# Patient Record
Sex: MALE | Race: WHITE | NOT HISPANIC OR LATINO
[De-identification: names, ages, dates, MRNs, and addresses within clinical notes are randomized per-mention and may not be internally consistent; named-entity substitution may affect disease eponyms.]

---

## 2017-05-01 ENCOUNTER — NON-APPOINTMENT (OUTPATIENT)
Age: 77
End: 2017-05-01

## 2017-05-01 ENCOUNTER — APPOINTMENT (OUTPATIENT)
Dept: CARDIOLOGY | Facility: CLINIC | Age: 77
End: 2017-05-01

## 2017-05-01 VITALS
SYSTOLIC BLOOD PRESSURE: 114 MMHG | HEIGHT: 64.5 IN | HEART RATE: 67 BPM | OXYGEN SATURATION: 98 % | WEIGHT: 139.5 LBS | BODY MASS INDEX: 23.53 KG/M2 | DIASTOLIC BLOOD PRESSURE: 70 MMHG

## 2017-05-16 ENCOUNTER — APPOINTMENT (OUTPATIENT)
Dept: CARDIOLOGY | Facility: CLINIC | Age: 77
End: 2017-05-16

## 2019-04-22 ENCOUNTER — NON-APPOINTMENT (OUTPATIENT)
Age: 79
End: 2019-04-22

## 2019-04-22 ENCOUNTER — APPOINTMENT (OUTPATIENT)
Dept: CARDIOLOGY | Facility: CLINIC | Age: 79
End: 2019-04-22
Payer: MEDICARE

## 2019-04-22 VITALS
BODY MASS INDEX: 23.22 KG/M2 | WEIGHT: 136 LBS | SYSTOLIC BLOOD PRESSURE: 103 MMHG | HEART RATE: 68 BPM | HEIGHT: 64 IN | DIASTOLIC BLOOD PRESSURE: 64 MMHG | OXYGEN SATURATION: 96 %

## 2019-04-22 DIAGNOSIS — R09.89 OTHER SPECIFIED SYMPTOMS AND SIGNS INVOLVING THE CIRCULATORY AND RESPIRATORY SYSTEMS: ICD-10-CM

## 2019-04-22 PROCEDURE — 93000 ELECTROCARDIOGRAM COMPLETE: CPT

## 2019-04-22 PROCEDURE — 99215 OFFICE O/P EST HI 40 MIN: CPT

## 2019-04-22 NOTE — DISCUSSION/SUMMARY
[FreeTextEntry1] : The patient has no signs or symptoms of active heart disease. He is physically active and has no chest pain or shortness of breath. On his medications cholesterol is well controlled and his blood pressure is excellent. He is concerned about his coronary arteries.\par \par We discussed about doing a cardiac calcium score which may be worthwhile. This will qualitate the amount of coronary plaque and allow us to better decide if we want to be more aggressive with his cholesterol medication. He'll also schedule a repeat carotid Doppler.\par \par On the basis of these tests we'll decide if we want to change his statin medication. Otherwise he'll continue with his good healthy diet and exercise. If he does have symptoms he will call me.\par \par This was all discussed in detail with the patient his wife and I answered all of their questions.

## 2019-04-22 NOTE — PHYSICAL EXAM
[General Appearance - Well Developed] : well developed [Normal Appearance] : normal appearance [Well Groomed] : well groomed [General Appearance - Well Nourished] : well nourished [No Deformities] : no deformities [General Appearance - In No Acute Distress] : no acute distress [Normal Conjunctiva] : the conjunctiva exhibited no abnormalities [Eyelids - No Xanthelasma] : the eyelids demonstrated no xanthelasmas [Normal Oral Mucosa] : normal oral mucosa [Normal Jugular Venous A Waves Present] : normal jugular venous A waves present [No Jugular Venous Madrigal A Waves] : no jugular venous madrigal A waves [Normal Jugular Venous V Waves Present] : normal jugular venous V waves present [Respiration, Rhythm And Depth] : normal respiratory rhythm and effort [Exaggerated Use Of Accessory Muscles For Inspiration] : no accessory muscle use [Auscultation Breath Sounds / Voice Sounds] : lungs were clear to auscultation bilaterally [Bowel Sounds] : normal bowel sounds [Abdomen Soft] : soft [Abdomen Tenderness] : non-tender [Abnormal Walk] : normal gait [Gait - Sufficient For Exercise Testing] : the gait was sufficient for exercise testing [Nail Clubbing] : no clubbing of the fingernails [Skin Color & Pigmentation] : normal skin color and pigmentation [Cyanosis, Localized] : no localized cyanosis [Skin Turgor] : normal skin turgor [] : no rash [Oriented To Time, Place, And Person] : oriented to person, place, and time [Affect] : the affect was normal [Mood] : the mood was normal [No Anxiety] : not feeling anxious [Normal Rate] : normal [Normal S1] : normal S1 [Normal S2] : normal S2 [No Murmur] : no murmurs heard [2+] : left 2+ [1+] : left 1+ [No Abnormalities] : the abdominal aorta was not enlarged and no bruit was heard [No Pitting Edema] : no pitting edema present [S4] : no S4 [S3] : no S3 [Right Carotid Bruit] : no bruit heard over the right carotid [Right Femoral Bruit] : no bruit heard over the right femoral artery [Left Carotid Bruit] : no bruit heard over the left carotid [Left Femoral Bruit] : no bruit heard over the left femoral artery

## 2019-04-22 NOTE — HISTORY OF PRESENT ILLNESS
[FreeTextEntry1] : I saw Carlos Madden to our office for cardiac followup. He is a 78-year-old white male who has enjoyed good general health. He is presently being treated for hyperlipidemia. He has a remote history of smoking stopping at least 30 years ago. Has no history of diabetes, hypertension, or family history of heart disease.\par \par The patient has been undergoing CAT scans of the chest to check on pulmonary nodules which have been stable.. On a CAT scan 4/12  coronary artery calcification was noted. The patient had a stress test in my office 5/17 which was negative for ischemia at a workload of 10METs\par \par The patient exercises regularly. He walks 3 miles every other day and lifts weights. No exertional chest pain, shortness of breath, lightheadedness, or palpitations.\par \par He has no prior hospitalizations. He did fracture her shoulder, otherwise has had no surgery.\par \par He drinks wine socially and has one cup of a mix of decaf in caffeinated coffee a day.\par \par Blood work dated 4/19 demonstrates a total cholesterol 137, HDL 56, and LDL 65. Carotid Doppler performed 5/16 demonstrated mild to moderate plaque.Patient's brother recently had a cardiac stent placed for extensive plaque. The patient a repeat CAT scan of his chest performed 12/18. It's listed as coronary artery calcification. No quantification.\par \par A resting 12-lead electrocardiogram demonstrates sinus rhythm and appears to be normal.

## 2019-04-22 NOTE — REVIEW OF SYSTEMS
[Eyeglasses] : currently wearing eyeglasses [Urinary Frequency] : urinary frequency [Nocturia] : nocturia [Negative] : Psychiatric [Fever] : no fever [Headache] : no headache [Chills] : no chills [Feeling Fatigued] : not feeling fatigued [Blurry Vision] : no blurred vision [Hematuria] : no hematuria [Pain During Urination] : no dysuria [Joint Pain] : no joint pain [Muscle Cramps] : no muscle cramps [Skin: A Rash] : no rash: [Skin Lesions] : no skin lesions [Tremor] : no tremor was seen [Dizziness] : no dizziness [Convulsions] : no convulsions [Easy Bleeding] : no tendency for easy bleeding [Easy Bruising] : no tendency for easy bruising

## 2019-04-22 NOTE — REASON FOR VISIT
[Follow-Up - Clinic] : a clinic follow-up of [Hyperlipidemia] : hyperlipidemia [FreeTextEntry1] : Calcification of coronary arteries on CT scan

## 2019-04-26 ENCOUNTER — APPOINTMENT (OUTPATIENT)
Dept: CARDIOLOGY | Facility: CLINIC | Age: 79
End: 2019-04-26
Payer: MEDICARE

## 2019-04-26 PROCEDURE — 93880 EXTRACRANIAL BILAT STUDY: CPT

## 2019-05-03 ENCOUNTER — APPOINTMENT (OUTPATIENT)
Dept: CARDIOLOGY | Facility: CLINIC | Age: 79
End: 2019-05-03
Payer: MEDICARE

## 2019-05-03 PROCEDURE — A9500: CPT

## 2019-05-03 PROCEDURE — 78452 HT MUSCLE IMAGE SPECT MULT: CPT

## 2019-05-03 PROCEDURE — 93015 CV STRESS TEST SUPVJ I&R: CPT

## 2019-05-06 RX ORDER — ROSUVASTATIN CALCIUM 20 MG/1
20 TABLET, FILM COATED ORAL DAILY
Qty: 30 | Refills: 5 | Status: ACTIVE | COMMUNITY
Start: 1900-01-01 | End: 1900-01-01

## 2019-10-17 ENCOUNTER — APPOINTMENT (OUTPATIENT)
Dept: CARDIOLOGY | Facility: CLINIC | Age: 79
End: 2019-10-17
Payer: MEDICARE

## 2019-10-17 VITALS
OXYGEN SATURATION: 96 % | HEIGHT: 64 IN | DIASTOLIC BLOOD PRESSURE: 64 MMHG | BODY MASS INDEX: 22.53 KG/M2 | HEART RATE: 68 BPM | SYSTOLIC BLOOD PRESSURE: 133 MMHG | WEIGHT: 132 LBS

## 2019-10-17 PROCEDURE — 99215 OFFICE O/P EST HI 40 MIN: CPT

## 2019-10-17 NOTE — DISCUSSION/SUMMARY
[FreeTextEntry1] : Discussed in detail the results of the calcium score, stress test, and carotid Doppler. Went over his medications and his blood work. The patient is doing well. Since he developed his plaque on Zocor 40 mg once a day, I suggest that we increase the Crestor at 40 mg once a day as a maximum protective dose. Prior to raising the dose, I would start the patient on CoQ10 since he is getting some muscle cramps at night in bed. This predated the Crestor and probably is not related.\par \par The patient wants to think about this. He'll call me in several weeks so that we can decide what do. Otherwise I will see him in 3 months. If he does develop exertional symptoms he will call me.\par \par This was all discussed with the patient and his wife and I answered all their questions.

## 2019-10-17 NOTE — PHYSICAL EXAM
[Well Groomed] : well groomed [Normal Appearance] : normal appearance [General Appearance - Well Developed] : well developed [General Appearance - Well Nourished] : well nourished [No Deformities] : no deformities [General Appearance - In No Acute Distress] : no acute distress [Normal Conjunctiva] : the conjunctiva exhibited no abnormalities [Normal Oral Mucosa] : normal oral mucosa [Eyelids - No Xanthelasma] : the eyelids demonstrated no xanthelasmas [Normal Jugular Venous V Waves Present] : normal jugular venous V waves present [No Jugular Venous Madrigal A Waves] : no jugular venous madrigal A waves [Normal Jugular Venous A Waves Present] : normal jugular venous A waves present [Respiration, Rhythm And Depth] : normal respiratory rhythm and effort [Exaggerated Use Of Accessory Muscles For Inspiration] : no accessory muscle use [Auscultation Breath Sounds / Voice Sounds] : lungs were clear to auscultation bilaterally [Abdomen Tenderness] : non-tender [Bowel Sounds] : normal bowel sounds [Abdomen Soft] : soft [Gait - Sufficient For Exercise Testing] : the gait was sufficient for exercise testing [Abnormal Walk] : normal gait [Nail Clubbing] : no clubbing of the fingernails [Skin Turgor] : normal skin turgor [Cyanosis, Localized] : no localized cyanosis [Skin Color & Pigmentation] : normal skin color and pigmentation [] : no rash [Oriented To Time, Place, And Person] : oriented to person, place, and time [Affect] : the affect was normal [Mood] : the mood was normal [Normal Rate] : normal [No Anxiety] : not feeling anxious [Normal S2] : normal S2 [Normal S1] : normal S1 [No Murmur] : no murmurs heard [2+] : left 2+ [1+] : right 1+ [No Abnormalities] : the abdominal aorta was not enlarged and no bruit was heard [No Pitting Edema] : no pitting edema present [S3] : no S3 [S4] : no S4 [Right Carotid Bruit] : no bruit heard over the right carotid [Left Carotid Bruit] : no bruit heard over the left carotid [Right Femoral Bruit] : no bruit heard over the right femoral artery [Left Femoral Bruit] : no bruit heard over the left femoral artery

## 2019-10-17 NOTE — REVIEW OF SYSTEMS
[Eyeglasses] : currently wearing eyeglasses [Urinary Frequency] : urinary frequency [Nocturia] : nocturia [Negative] : Psychiatric [Fever] : no fever [Headache] : no headache [Chills] : no chills [Feeling Fatigued] : not feeling fatigued [Blurry Vision] : no blurred vision [Hematuria] : no hematuria [Pain During Urination] : no dysuria [Muscle Cramps] : no muscle cramps [Joint Pain] : no joint pain [Skin Lesions] : no skin lesions [Skin: A Rash] : no rash: [Dizziness] : no dizziness [Tremor] : no tremor was seen [Convulsions] : no convulsions [Easy Bleeding] : no tendency for easy bleeding [Easy Bruising] : no tendency for easy bruising

## 2019-10-17 NOTE — HISTORY OF PRESENT ILLNESS
[FreeTextEntry1] : I saw Carlos Madden to our office for cardiac followup. He is a 79-year-old white male who has enjoyed good general health. He is presently being treated for hyperlipidemia. He has a remote history of smoking stopping at least 30 years ago. Has no history of diabetes, hypertension, or family history of heart disease.\par \par The patient has been undergoing CAT scans of the chest to check on pulmonary nodules which have been stable.. On a CAT scan 4/12  coronary artery calcification was noted. The patient had a stress test in my office 5/17 which was negative for ischemia at a workload of 10METs\par \par The patient exercises regularly. He walks 3 miles every other day and lifts weights. No exertional chest pain, shortness of breath, lightheadedness, or palpitations.\par \par He has no prior hospitalizations. He did fracture her shoulder, otherwise has had no surgery.\par \par He drinks wine socially and has one cup of a mix of decaf in caffeinated coffee a day.\par \par Blood work dated 4/19 demonstrates a total cholesterol 137, HDL 56, and LDL 65. Carotid Doppler performed 5/16 demonstrated mild to moderate plaque.Patient's brother recently had a cardiac stent placed for extensive plaque. \par \par The patient had a cardiac calcium score performed 5/19. The total score was 523.   11 left main, 330 LAD, 93 Circumflex, and 88 RCA. The patient a nuclear stress test that showed no ischemia at workload of 10 minutes. EF 67%. Carotid Doppler showed mild nonobstructive plaque. The patient is now taking aspirin 81 mg once a day and we increased his statin drug to Crestor 20 mg once a day. Recent blood work demonstrates a cholesterol 112, triglycerides 75, HDL 52, and LDL 48.

## 2019-10-17 NOTE — REASON FOR VISIT
[Hyperlipidemia] : hyperlipidemia [Follow-Up - Clinic] : a clinic follow-up of [FreeTextEntry1] : Calcification of coronary arteries on CT scan

## 2020-01-22 ENCOUNTER — APPOINTMENT (OUTPATIENT)
Dept: CARDIOLOGY | Facility: CLINIC | Age: 80
End: 2020-01-22
Payer: MEDICARE

## 2020-01-22 ENCOUNTER — NON-APPOINTMENT (OUTPATIENT)
Age: 80
End: 2020-01-22

## 2020-01-22 VITALS
BODY MASS INDEX: 23.9 KG/M2 | DIASTOLIC BLOOD PRESSURE: 74 MMHG | HEART RATE: 71 BPM | HEIGHT: 64 IN | WEIGHT: 140 LBS | OXYGEN SATURATION: 97 % | SYSTOLIC BLOOD PRESSURE: 129 MMHG

## 2020-01-22 PROCEDURE — 99214 OFFICE O/P EST MOD 30 MIN: CPT

## 2020-01-22 PROCEDURE — 93000 ELECTROCARDIOGRAM COMPLETE: CPT

## 2020-01-22 NOTE — REVIEW OF SYSTEMS
[Eyeglasses] : currently wearing eyeglasses [Urinary Frequency] : urinary frequency [Nocturia] : nocturia [Negative] : Psychiatric [Fever] : no fever [Headache] : no headache [Chills] : no chills [Feeling Fatigued] : not feeling fatigued [Blurry Vision] : no blurred vision [Hematuria] : no hematuria [Pain During Urination] : no dysuria [Joint Pain] : no joint pain [Muscle Cramps] : no muscle cramps [Skin: A Rash] : no rash: [Skin Lesions] : no skin lesions [Dizziness] : no dizziness [Tremor] : no tremor was seen [Convulsions] : no convulsions [Easy Bleeding] : no tendency for easy bleeding [Easy Bruising] : no tendency for easy bruising

## 2020-01-22 NOTE — DISCUSSION/SUMMARY
[FreeTextEntry1] : The patient continues to do well without any signs or symptoms of active heart disease. He exercises regularly and has no chest pain or shortness of breath. Has a high cardiac calcium score with a normal stress test. Cholesterol is well controlled and his blood pressure is normal.\par \par He will stay on his present medication. If he does develop exertional symptoms he will call me. We did discuss his most recent blood tests, carotid Doppler, and stress test. If all is well I will see him in 6 months.

## 2020-01-22 NOTE — HISTORY OF PRESENT ILLNESS
[FreeTextEntry1] : I saw Carlos Madden to our office for cardiac followup. He is a 79-year-old white male who has enjoyed good general health. He is presently being treated for hyperlipidemia. He has a remote history of smoking stopping at least 30 years ago. Has no history of diabetes, hypertension, or family history of heart disease.\par \par The patient has been undergoing CAT scans of the chest to check on pulmonary nodules which have been stable.. On a CAT scan 4/12  coronary artery calcification was noted. The patient had a stress test in my office 5/17 which was negative for ischemia at a workload of 10METs\par \par The patient exercises regularly. He walks 3 miles every other day and lifts weights. No exertional chest pain, shortness of breath, lightheadedness, or palpitations.\par \par He has no prior hospitalizations. He did fracture her shoulder, otherwise has had no surgery.\par \par He drinks wine socially and has one cup of a mix of decaf in caffeinated coffee a day.\par \par .Carotid Doppler performed 4/19 demonstrated mild plaque.Patient's brother recently had a cardiac stent placed for extensive plaque. \par \par The patient had a cardiac calcium score performed 5/19. The total score was 523.   11 left main, 330 LAD, 93 Circumflex, and 88 RCA. The patient a nuclear stress test that showed no ischemia at workload of 10 minutes. EF 67%. Carotid Doppler showed mild nonobstructive plaque. The patient is now taking aspirin 81 mg once a day and we increased his statin drug to Crestor 20 mg once a day. Blood work  10/19, demonstrates a cholesterol 112, triglycerides 75, HDL 52, and LDL 45.\par \par ECG shows sinus rhythm and remains normal.

## 2020-01-22 NOTE — PHYSICAL EXAM
[General Appearance - Well Developed] : well developed [Normal Appearance] : normal appearance [Well Groomed] : well groomed [General Appearance - Well Nourished] : well nourished [No Deformities] : no deformities [General Appearance - In No Acute Distress] : no acute distress [Normal Conjunctiva] : the conjunctiva exhibited no abnormalities [Normal Oral Mucosa] : normal oral mucosa [Eyelids - No Xanthelasma] : the eyelids demonstrated no xanthelasmas [Normal Jugular Venous A Waves Present] : normal jugular venous A waves present [Normal Jugular Venous V Waves Present] : normal jugular venous V waves present [No Jugular Venous Madrigal A Waves] : no jugular venous madrigal A waves [Respiration, Rhythm And Depth] : normal respiratory rhythm and effort [Exaggerated Use Of Accessory Muscles For Inspiration] : no accessory muscle use [Auscultation Breath Sounds / Voice Sounds] : lungs were clear to auscultation bilaterally [Bowel Sounds] : normal bowel sounds [Abdomen Soft] : soft [Abdomen Tenderness] : non-tender [Abnormal Walk] : normal gait [Gait - Sufficient For Exercise Testing] : the gait was sufficient for exercise testing [Nail Clubbing] : no clubbing of the fingernails [Cyanosis, Localized] : no localized cyanosis [Skin Color & Pigmentation] : normal skin color and pigmentation [Oriented To Time, Place, And Person] : oriented to person, place, and time [] : no rash [Skin Turgor] : normal skin turgor [Affect] : the affect was normal [Mood] : the mood was normal [No Anxiety] : not feeling anxious [Normal Rate] : normal [Normal S1] : normal S1 [Normal S2] : normal S2 [No Murmur] : no murmurs heard [2+] : left 2+ [1+] : left 1+ [No Abnormalities] : the abdominal aorta was not enlarged and no bruit was heard [No Pitting Edema] : no pitting edema present [S4] : no S4 [Right Carotid Bruit] : no bruit heard over the right carotid [S3] : no S3 [Left Carotid Bruit] : no bruit heard over the left carotid [Right Femoral Bruit] : no bruit heard over the right femoral artery [Left Femoral Bruit] : no bruit heard over the left femoral artery

## 2020-08-10 ENCOUNTER — APPOINTMENT (OUTPATIENT)
Dept: CARDIOLOGY | Facility: CLINIC | Age: 80
End: 2020-08-10
Payer: MEDICARE

## 2020-08-10 VITALS
HEIGHT: 64 IN | DIASTOLIC BLOOD PRESSURE: 64 MMHG | BODY MASS INDEX: 23.56 KG/M2 | WEIGHT: 138 LBS | HEART RATE: 70 BPM | OXYGEN SATURATION: 96 % | SYSTOLIC BLOOD PRESSURE: 107 MMHG

## 2020-08-10 PROCEDURE — 99214 OFFICE O/P EST MOD 30 MIN: CPT

## 2020-08-10 NOTE — DISCUSSION/SUMMARY
[FreeTextEntry1] : The patient continues to do well. He has no signs or symptoms of active heart disease. He has no chest pain or shortness of breath. Blood pressure and cholesterol are excellent.\par \par He'll stay on his present medication. We did go over his stress test from last year as well as his carotid Doppler. He will stay on his present medications. If he does have any problems or symptoms he will call me. Otherwise I will see him in 6 months.\par \par We discussed his medication and I answered his questions.

## 2020-08-10 NOTE — PHYSICAL EXAM
[General Appearance - Well Developed] : well developed [Well Groomed] : well groomed [General Appearance - Well Nourished] : well nourished [Normal Appearance] : normal appearance [Normal Conjunctiva] : the conjunctiva exhibited no abnormalities [General Appearance - In No Acute Distress] : no acute distress [No Deformities] : no deformities [Eyelids - No Xanthelasma] : the eyelids demonstrated no xanthelasmas [Normal Oral Mucosa] : normal oral mucosa [No Jugular Venous Madrigal A Waves] : no jugular venous madrigal A waves [Normal Jugular Venous A Waves Present] : normal jugular venous A waves present [Normal Jugular Venous V Waves Present] : normal jugular venous V waves present [Respiration, Rhythm And Depth] : normal respiratory rhythm and effort [Exaggerated Use Of Accessory Muscles For Inspiration] : no accessory muscle use [Auscultation Breath Sounds / Voice Sounds] : lungs were clear to auscultation bilaterally [Bowel Sounds] : normal bowel sounds [Abdomen Soft] : soft [Abdomen Tenderness] : non-tender [Abnormal Walk] : normal gait [Gait - Sufficient For Exercise Testing] : the gait was sufficient for exercise testing [Skin Color & Pigmentation] : normal skin color and pigmentation [Cyanosis, Localized] : no localized cyanosis [Nail Clubbing] : no clubbing of the fingernails [Skin Turgor] : normal skin turgor [] : no rash [Oriented To Time, Place, And Person] : oriented to person, place, and time [Affect] : the affect was normal [No Anxiety] : not feeling anxious [Mood] : the mood was normal [Normal S1] : normal S1 [Normal Rate] : normal [Normal S2] : normal S2 [No Murmur] : no murmurs heard [2+] : left 2+ [1+] : right 1+ [No Abnormalities] : the abdominal aorta was not enlarged and no bruit was heard [No Pitting Edema] : no pitting edema present [S3] : no S3 [Right Carotid Bruit] : no bruit heard over the right carotid [S4] : no S4 [Left Carotid Bruit] : no bruit heard over the left carotid [Right Femoral Bruit] : no bruit heard over the right femoral artery [Left Femoral Bruit] : no bruit heard over the left femoral artery

## 2020-08-10 NOTE — REVIEW OF SYSTEMS
[Eyeglasses] : currently wearing eyeglasses [Urinary Frequency] : urinary frequency [Nocturia] : nocturia [Negative] : Psychiatric [Fever] : no fever [Headache] : no headache [Chills] : no chills [Feeling Fatigued] : not feeling fatigued [Blurry Vision] : no blurred vision [Hematuria] : no hematuria [Pain During Urination] : no dysuria [Muscle Cramps] : no muscle cramps [Joint Pain] : no joint pain [Skin: A Rash] : no rash: [Skin Lesions] : no skin lesions [Dizziness] : no dizziness [Convulsions] : no convulsions [Tremor] : no tremor was seen [Easy Bruising] : no tendency for easy bruising [Easy Bleeding] : no tendency for easy bleeding

## 2020-08-10 NOTE — HISTORY OF PRESENT ILLNESS
[FreeTextEntry1] : I saw Carlos Madden to our office for cardiac followup. He is an 80-year-old white male who has enjoyed good general health. He is presently being treated for hyperlipidemia. He has a remote history of smoking stopping at least 30 years ago. Has no history of diabetes, hypertension, or family history of heart disease.\par \par The patient has been undergoing CAT scans of the chest to check on pulmonary nodules which have been stable.. On a CAT scan 4/12  coronary artery calcification was noted. The patient had a stress test in my office 5/17 which was negative for ischemia at a workload of 10METs\par \par The patient exercises regularly. He walks 3 miles every other day and lifts weights. No exertional chest pain, shortness of breath, lightheadedness, or palpitations.\par \par He has no prior hospitalizations. He did fracture her shoulder, otherwise has had no surgery.\par \par He drinks wine socially and has one cup of a mix of decaf in caffeinated coffee a day.\par \par .Carotid Doppler performed 4/19 demonstrated mild plaque.Patient's brother recently had a cardiac stent placed for extensive plaque. \par \par The patient had a cardiac calcium score performed 5/19. The total score was 523.   11 left main, 330 LAD, 93 Circumflex, and 88 RCA. The patient a nuclear stress test that showed no ischemia at workload of 10 minutes. EF 67%. Carotid Doppler showed mild nonobstructive plaque. The patient is now taking aspirin 81 mg once a day and we increased his statin drug to Crestor 20 mg once a day. Blood work 4/20 demonstrated a total cholesterol 117, triglycerides 64, HDL 52, and LDL 52\par \par The patient is physically active and has no chest pain or shortness of breath.\par \par ECG shows sinus rhythm and remains normal.

## 2021-02-10 ENCOUNTER — APPOINTMENT (OUTPATIENT)
Dept: CARDIOLOGY | Facility: CLINIC | Age: 81
End: 2021-02-10
Payer: MEDICARE

## 2021-02-10 ENCOUNTER — NON-APPOINTMENT (OUTPATIENT)
Age: 81
End: 2021-02-10

## 2021-02-10 VITALS
WEIGHT: 140 LBS | DIASTOLIC BLOOD PRESSURE: 60 MMHG | HEIGHT: 65 IN | HEART RATE: 69 BPM | BODY MASS INDEX: 23.32 KG/M2 | OXYGEN SATURATION: 95 % | SYSTOLIC BLOOD PRESSURE: 107 MMHG

## 2021-02-10 PROCEDURE — 99214 OFFICE O/P EST MOD 30 MIN: CPT

## 2021-02-10 PROCEDURE — 93000 ELECTROCARDIOGRAM COMPLETE: CPT

## 2021-02-10 NOTE — DISCUSSION/SUMMARY
[FreeTextEntry1] : The patient is doing well without any signs or symptoms of active heart disease. He remains physically active and has no chest pain, shortness of breath, or palpitation. Blood pressure is excellent and on medication his cholesterol is well controlled.\par \par He has an appointment to see you and if you draw blood work I would appreciate a copy for my review. We did go over his medication, his previous blood work and his cardiac testing and I answered his questions.\par \par If he does have any exertional symptoms he will call me. If all is well I will see him in 6 months.

## 2021-02-10 NOTE — HISTORY OF PRESENT ILLNESS
[FreeTextEntry1] : I saw Carlos Madden to our office for cardiac followup. He is an 80-year-old white male who has enjoyed good general health. He is presently being treated for hyperlipidemia. He has a remote history of smoking stopping at least 30 years ago. Has no history of diabetes, hypertension, or family history of heart disease.\par \par The patient has been undergoing CAT scans of the chest to check on pulmonary nodules which have been stable.. On a CAT scan 4/12  coronary artery calcification was noted. The patient had a stress test in my office 5/17 which was negative for ischemia at a workload of 10METs\par \par The patient exercises regularly. He walks 3 miles every other day and lifts weights. No exertional chest pain, shortness of breath, lightheadedness, or palpitations.\par \par He has no prior hospitalizations. He did fracture her shoulder, otherwise has had no surgery.\par \par He drinks wine socially and has one cup of a mix of decaf in caffeinated coffee a day.\par \par .Carotid Doppler performed 4/19 demonstrated mild plaque.Patient's brother recently had a cardiac stent placed for extensive plaque. \par \par The patient had a cardiac calcium score performed 5/19. The total score was 523.   11 left main, 330 LAD, 93 Circumflex, and 88 RCA. The patient a nuclear stress test that showed no ischemia at workload of 10 minutes. EF 67%. Carotid Doppler showed mild nonobstructive plaque. The patient is now taking aspirin 81 mg once a day and we increased his statin drug to Crestor 20 mg once a day. Blood work 8/20 demonstrated a total cholesterol 120, triglycerides 83, HDL 52, and LDL 51\par \par The patient is physically active and has no chest pain or shortness of breath.\par \par ECG shows sinus rhythm and remains normal.

## 2021-02-10 NOTE — PHYSICAL EXAM
[General Appearance - Well Developed] : well developed [Normal Appearance] : normal appearance [Well Groomed] : well groomed [General Appearance - Well Nourished] : well nourished [No Deformities] : no deformities [General Appearance - In No Acute Distress] : no acute distress [Normal Conjunctiva] : the conjunctiva exhibited no abnormalities [Normal Oral Mucosa] : normal oral mucosa [Eyelids - No Xanthelasma] : the eyelids demonstrated no xanthelasmas [Normal Jugular Venous A Waves Present] : normal jugular venous A waves present [Normal Jugular Venous V Waves Present] : normal jugular venous V waves present [No Jugular Venous Madrigal A Waves] : no jugular venous madrigal A waves [Respiration, Rhythm And Depth] : normal respiratory rhythm and effort [Auscultation Breath Sounds / Voice Sounds] : lungs were clear to auscultation bilaterally [Exaggerated Use Of Accessory Muscles For Inspiration] : no accessory muscle use [Bowel Sounds] : normal bowel sounds [Abdomen Soft] : soft [Abdomen Tenderness] : non-tender [Abnormal Walk] : normal gait [Nail Clubbing] : no clubbing of the fingernails [Gait - Sufficient For Exercise Testing] : the gait was sufficient for exercise testing [Cyanosis, Localized] : no localized cyanosis [Skin Color & Pigmentation] : normal skin color and pigmentation [Skin Turgor] : normal skin turgor [] : no rash [Oriented To Time, Place, And Person] : oriented to person, place, and time [Mood] : the mood was normal [Affect] : the affect was normal [No Anxiety] : not feeling anxious [Normal Rate] : normal [Normal S2] : normal S2 [Normal S1] : normal S1 [No Murmur] : no murmurs heard [2+] : left 2+ [1+] : right 1+ [No Abnormalities] : the abdominal aorta was not enlarged and no bruit was heard [No Pitting Edema] : no pitting edema present [S3] : no S3 [S4] : no S4 [Right Carotid Bruit] : no bruit heard over the right carotid [Left Carotid Bruit] : no bruit heard over the left carotid [Right Femoral Bruit] : no bruit heard over the right femoral artery [Left Femoral Bruit] : no bruit heard over the left femoral artery

## 2021-02-26 ENCOUNTER — TRANSCRIPTION ENCOUNTER (OUTPATIENT)
Age: 81
End: 2021-02-26

## 2021-08-10 ENCOUNTER — APPOINTMENT (OUTPATIENT)
Dept: CARDIOLOGY | Facility: CLINIC | Age: 81
End: 2021-08-10
Payer: MEDICARE

## 2021-08-10 VITALS
HEIGHT: 65 IN | OXYGEN SATURATION: 98 % | SYSTOLIC BLOOD PRESSURE: 96 MMHG | HEART RATE: 62 BPM | BODY MASS INDEX: 22.49 KG/M2 | DIASTOLIC BLOOD PRESSURE: 60 MMHG | WEIGHT: 135 LBS

## 2021-08-10 PROCEDURE — 99214 OFFICE O/P EST MOD 30 MIN: CPT

## 2021-08-10 NOTE — HISTORY OF PRESENT ILLNESS
[FreeTextEntry1] : I saw Carlos Madden to our office for cardiac followup. He is an 81-year-old white male who has enjoyed good general health. He is presently being treated for hyperlipidemia. He has a remote history of smoking stopping at least 30 years ago. Has no history of diabetes, hypertension, or family history of heart disease.\par \par The patient has been undergoing CAT scans of the chest to check on pulmonary nodules which have been stable.. On a CAT scan 4/12  coronary artery calcification was noted. The patient had a stress test in my office 5/17 which was negative for ischemia at a workload of 10METs\par \par The patient exercises regularly. He walks 3 miles every other day and lifts weights. No exertional chest pain, shortness of breath, lightheadedness, or palpitations.\par \par He has no prior hospitalizations. He did fracture her shoulder, otherwise has had no surgery.\par \par He drinks wine socially and has one cup of a mix of decaf in caffeinated coffee a day.\par \par .Carotid Doppler performed 4/19 demonstrated mild plaque.Patient's brother recently had a cardiac stent placed for extensive plaque. \par \par The patient had a cardiac calcium score performed 5/19. The total score was 523.   11 left main, 330 LAD, 93 Circumflex, and 88 RCA. The patient a nuclear stress test that showed no ischemia at workload of 10 minutes. EF 67%. Carotid Doppler showed mild nonobstructive plaque. The patient is now taking aspirin 81 mg once a day and we increased his statin drug to Crestor 20 mg once a day.  Work 2/21 demonstrated a cholesterol of 120, triglycerides 81, HDL 54, and LDL 50.  A1c was 5.6.\par \par The patient is physically active and has no chest pain or shortness of breath.\par \par

## 2021-08-10 NOTE — DISCUSSION/SUMMARY
[FreeTextEntry1] : Patient is doing well.  He has no signs or symptoms of active heart disease.  He is physically active and has no chest pain, shortness of breath, palpitation.  Blood pressure is excellent.  On his medication his cholesterol is well controlled.\par \par We went over his most recent blood work and his medication, and I answered his questions.  If he does have any problems he would call me.  If all is well I will see him in 6 months.  I would appreciate a copy of his most recent blood work for my review.

## 2021-08-10 NOTE — PHYSICAL EXAM
[General Appearance - Well Developed] : well developed [Normal Appearance] : normal appearance [Well Groomed] : well groomed [General Appearance - Well Nourished] : well nourished [No Deformities] : no deformities [General Appearance - In No Acute Distress] : no acute distress [Normal Conjunctiva] : the conjunctiva exhibited no abnormalities [Eyelids - No Xanthelasma] : the eyelids demonstrated no xanthelasmas [Normal Oral Mucosa] : normal oral mucosa [Normal Jugular Venous A Waves Present] : normal jugular venous A waves present [Normal Jugular Venous V Waves Present] : normal jugular venous V waves present [No Jugular Venous Madrigal A Waves] : no jugular venous madrigal A waves [Respiration, Rhythm And Depth] : normal respiratory rhythm and effort [Exaggerated Use Of Accessory Muscles For Inspiration] : no accessory muscle use [Auscultation Breath Sounds / Voice Sounds] : lungs were clear to auscultation bilaterally [Bowel Sounds] : normal bowel sounds [Abdomen Soft] : soft [Abdomen Tenderness] : non-tender [Abnormal Walk] : normal gait [Gait - Sufficient For Exercise Testing] : the gait was sufficient for exercise testing [Nail Clubbing] : no clubbing of the fingernails [Cyanosis, Localized] : no localized cyanosis [Skin Color & Pigmentation] : normal skin color and pigmentation [Skin Turgor] : normal skin turgor [] : no rash [Oriented To Time, Place, And Person] : oriented to person, place, and time [Affect] : the affect was normal [Mood] : the mood was normal [No Anxiety] : not feeling anxious [Normal Rate] : normal [Normal S1] : normal S1 [Normal S2] : normal S2 [No Murmur] : no murmurs heard [2+] : left 2+ [1+] : left 1+ [No Abnormalities] : the abdominal aorta was not enlarged and no bruit was heard [No Pitting Edema] : no pitting edema present [S3] : no S3 [S4] : no S4 [Right Carotid Bruit] : no bruit heard over the right carotid [Left Carotid Bruit] : no bruit heard over the left carotid [Right Femoral Bruit] : no bruit heard over the right femoral artery [Left Femoral Bruit] : no bruit heard over the left femoral artery

## 2021-08-10 NOTE — REVIEW OF SYSTEMS
[Urinary Frequency] : urinary frequency [Nocturia] : nocturia [Negative] : Gastrointestinal [Joint Pain] : no joint pain [Rash] : no rash [Dizziness] : no dizziness [Depression] : no depression [Anxiety] : no anxiety [Easy Bleeding] : no tendency for easy bleeding [Easy Bruising] : no tendency for easy bruising

## 2022-02-10 ENCOUNTER — NON-APPOINTMENT (OUTPATIENT)
Age: 82
End: 2022-02-10

## 2022-02-10 ENCOUNTER — APPOINTMENT (OUTPATIENT)
Dept: CARDIOLOGY | Facility: CLINIC | Age: 82
End: 2022-02-10
Payer: MEDICARE

## 2022-02-10 VITALS
BODY MASS INDEX: 23.66 KG/M2 | HEART RATE: 71 BPM | WEIGHT: 142 LBS | OXYGEN SATURATION: 96 % | HEIGHT: 65 IN | SYSTOLIC BLOOD PRESSURE: 125 MMHG | DIASTOLIC BLOOD PRESSURE: 67 MMHG

## 2022-02-10 PROCEDURE — 99214 OFFICE O/P EST MOD 30 MIN: CPT

## 2022-02-10 PROCEDURE — 93000 ELECTROCARDIOGRAM COMPLETE: CPT

## 2022-02-10 NOTE — HISTORY OF PRESENT ILLNESS
[FreeTextEntry1] : I saw Carlos Madden to our office for cardiac followup. He is an 81-year-old white male who has enjoyed good general health. He is presently being treated for hyperlipidemia. He has a remote history of smoking stopping at least 30 years ago. Has no history of diabetes, hypertension, or family history of heart disease.\par \par The patient has been undergoing CAT scans of the chest to check on pulmonary nodules which have been stable.. On a CAT scan 4/12  coronary artery calcification was noted. The patient had a stress test in my office 5/17 which was negative for ischemia at a workload of 10METs\par \par The patient exercises regularly. He walks 3 miles every other day and lifts weights. No exertional chest pain, shortness of breath, lightheadedness, or palpitations.\par \par He has no prior hospitalizations. He did fracture her shoulder, otherwise has had no surgery.\par \par He drinks wine socially and has one cup of a mix of decaf in caffeinated coffee a day.\par \par .Carotid Doppler performed 4/19 demonstrated mild plaque.Patient's brother recently had a cardiac stent placed for extensive plaque. \par \par The patient had a cardiac calcium score performed 5/19. The total score was 523.   11 left main, 330 LAD, 93 Circumflex, and 88 RCA. The patient a nuclear stress test 5/19 that showed no ischemia at workload of 10 minutes. EF 67%. Carotid Doppler showed mild nonobstructive plaque. The patient is now taking aspirin 81 mg once a day and we increased his statin drug to Crestor 20 mg once a day.  Work 2/21 demonstrated a cholesterol of 120, triglycerides 81, HDL 54, and LDL 50.  A1c was 5.6.\par \par The patient is physically active and has no chest pain or shortness of breath.\par \par ECG demonstrates sinus rhythm and remains normal.\par \par

## 2022-08-11 ENCOUNTER — APPOINTMENT (OUTPATIENT)
Dept: CARDIOLOGY | Facility: CLINIC | Age: 82
End: 2022-08-11

## 2022-10-06 ENCOUNTER — NON-APPOINTMENT (OUTPATIENT)
Age: 82
End: 2022-10-06

## 2022-10-06 ENCOUNTER — APPOINTMENT (OUTPATIENT)
Dept: CARDIOLOGY | Facility: CLINIC | Age: 82
End: 2022-10-06

## 2022-10-06 VITALS — HEART RATE: 60 BPM | OXYGEN SATURATION: 95 % | DIASTOLIC BLOOD PRESSURE: 60 MMHG | SYSTOLIC BLOOD PRESSURE: 110 MMHG

## 2022-10-06 DIAGNOSIS — I25.10 ATHEROSCLEROTIC HEART DISEASE OF NATIVE CORONARY ARTERY W/OUT ANGINA PECTORIS: ICD-10-CM

## 2022-10-06 DIAGNOSIS — I65.29 OCCLUSION AND STENOSIS OF UNSPECIFIED CAROTID ARTERY: ICD-10-CM

## 2022-10-06 PROCEDURE — 93000 ELECTROCARDIOGRAM COMPLETE: CPT

## 2022-10-06 PROCEDURE — 99214 OFFICE O/P EST MOD 30 MIN: CPT

## 2023-04-25 ENCOUNTER — APPOINTMENT (OUTPATIENT)
Dept: CARDIOLOGY | Facility: CLINIC | Age: 83
End: 2023-04-25
Payer: MEDICARE

## 2023-04-25 ENCOUNTER — NON-APPOINTMENT (OUTPATIENT)
Age: 83
End: 2023-04-25

## 2023-04-25 VITALS
OXYGEN SATURATION: 97 % | HEIGHT: 65 IN | WEIGHT: 142 LBS | SYSTOLIC BLOOD PRESSURE: 107 MMHG | BODY MASS INDEX: 23.66 KG/M2 | HEART RATE: 71 BPM | DIASTOLIC BLOOD PRESSURE: 63 MMHG

## 2023-04-25 PROCEDURE — 99214 OFFICE O/P EST MOD 30 MIN: CPT

## 2023-04-25 PROCEDURE — 93000 ELECTROCARDIOGRAM COMPLETE: CPT

## 2023-10-31 ENCOUNTER — NON-APPOINTMENT (OUTPATIENT)
Age: 83
End: 2023-10-31

## 2023-10-31 ENCOUNTER — APPOINTMENT (OUTPATIENT)
Dept: CARDIOLOGY | Facility: CLINIC | Age: 83
End: 2023-10-31
Payer: MEDICARE

## 2023-10-31 VITALS
OXYGEN SATURATION: 97 % | HEIGHT: 65 IN | HEART RATE: 69 BPM | SYSTOLIC BLOOD PRESSURE: 113 MMHG | WEIGHT: 135 LBS | BODY MASS INDEX: 22.49 KG/M2 | DIASTOLIC BLOOD PRESSURE: 70 MMHG

## 2023-10-31 PROCEDURE — 99214 OFFICE O/P EST MOD 30 MIN: CPT

## 2023-10-31 PROCEDURE — 93000 ELECTROCARDIOGRAM COMPLETE: CPT

## 2023-10-31 RX ORDER — NIRMATRELVIR AND RITONAVIR 300-100 MG
20 X 150 MG & KIT ORAL
Qty: 30 | Refills: 0 | Status: DISCONTINUED | COMMUNITY
Start: 2023-07-19

## 2024-05-07 ENCOUNTER — APPOINTMENT (OUTPATIENT)
Dept: CARDIOLOGY | Facility: CLINIC | Age: 84
End: 2024-05-07
Payer: MEDICARE

## 2024-05-07 ENCOUNTER — NON-APPOINTMENT (OUTPATIENT)
Age: 84
End: 2024-05-07

## 2024-05-07 VITALS
DIASTOLIC BLOOD PRESSURE: 68 MMHG | BODY MASS INDEX: 23.99 KG/M2 | HEART RATE: 69 BPM | WEIGHT: 144 LBS | HEIGHT: 65 IN | SYSTOLIC BLOOD PRESSURE: 115 MMHG | OXYGEN SATURATION: 97 %

## 2024-05-07 DIAGNOSIS — I25.10 ATHEROSCLEROTIC HEART DISEASE OF NATIVE CORONARY ARTERY W/OUT ANGINA PECTORIS: ICD-10-CM

## 2024-05-07 DIAGNOSIS — E78.5 HYPERLIPIDEMIA, UNSPECIFIED: ICD-10-CM

## 2024-05-07 PROCEDURE — 99214 OFFICE O/P EST MOD 30 MIN: CPT

## 2024-05-07 NOTE — HISTORY OF PRESENT ILLNESS
[FreeTextEntry1] : This is an 83-year-old man with a past medical history significant for hyperlipidemia and significant coronary artery calcifications who presents to the office for a follow-up visit.  He is quite active, walking 3 miles per day and felling well.  He also mows his own lawn.  He never has any chest pain or shortness of breath with exertion.  He has not noticed any decrease in his exercise tolerance.  He denies chest pain, dyspnea, PND, orthopnea, lower extremity swelling, dizziness, lightheadedness, or syncope.  He is taking a statin drug, and his LDL is at goal.  Overall, he has been doing well since his last visit here.  He had a CT scan that showed coronary calcifications but no symptoms.

## 2024-05-07 NOTE — DISCUSSION/SUMMARY
[FreeTextEntry1] : This is an 83 year-old man with a past medical history significant for hyperlipidemia, carotid artery disease, and significant coronary artery calcifications who presents to the office for a follow-up visit.  There is no evidence of active ischemia.  He has no new cardiac symptoms.  His blood pressure is very well controlled off of medications.  His LDL is at goal on 20 mg rosuvastatin daily, which we will continue.  We discussed that at this point the risk of aspirin may outweigh the benefit, and he will stop it.   Given no symptoms, we have elected to defer nuclear stress testing.  He will follow in six months.  [EKG obtained to assist in diagnosis and management of assessed problem(s)] : EKG obtained to assist in diagnosis and management of assessed problem(s)

## 2024-11-05 ENCOUNTER — APPOINTMENT (OUTPATIENT)
Dept: CARDIOLOGY | Facility: CLINIC | Age: 84
End: 2024-11-05
Payer: MEDICARE

## 2024-11-05 VITALS
DIASTOLIC BLOOD PRESSURE: 63 MMHG | HEIGHT: 65 IN | BODY MASS INDEX: 22.49 KG/M2 | HEART RATE: 68 BPM | SYSTOLIC BLOOD PRESSURE: 110 MMHG | WEIGHT: 135 LBS | OXYGEN SATURATION: 96 %

## 2024-11-05 DIAGNOSIS — E78.5 HYPERLIPIDEMIA, UNSPECIFIED: ICD-10-CM

## 2024-11-05 DIAGNOSIS — I25.10 ATHEROSCLEROTIC HEART DISEASE OF NATIVE CORONARY ARTERY W/OUT ANGINA PECTORIS: ICD-10-CM

## 2024-11-05 PROCEDURE — G2211 COMPLEX E/M VISIT ADD ON: CPT

## 2024-11-05 PROCEDURE — 93000 ELECTROCARDIOGRAM COMPLETE: CPT

## 2024-11-05 PROCEDURE — 99215 OFFICE O/P EST HI 40 MIN: CPT

## 2024-11-14 ENCOUNTER — NON-APPOINTMENT (OUTPATIENT)
Age: 84
End: 2024-11-14

## 2024-12-04 ENCOUNTER — APPOINTMENT (OUTPATIENT)
Dept: CARDIOLOGY | Facility: CLINIC | Age: 84
End: 2024-12-04
Payer: MEDICARE

## 2024-12-04 PROCEDURE — A9500: CPT

## 2024-12-04 PROCEDURE — 93015 CV STRESS TEST SUPVJ I&R: CPT

## 2024-12-04 PROCEDURE — 78452 HT MUSCLE IMAGE SPECT MULT: CPT

## 2025-01-07 ENCOUNTER — APPOINTMENT (OUTPATIENT)
Dept: CARDIOLOGY | Facility: CLINIC | Age: 85
End: 2025-01-07
Payer: MEDICARE

## 2025-01-07 PROCEDURE — 93880 EXTRACRANIAL BILAT STUDY: CPT

## 2025-01-07 PROCEDURE — 93306 TTE W/DOPPLER COMPLETE: CPT
